# Patient Record
Sex: FEMALE
[De-identification: names, ages, dates, MRNs, and addresses within clinical notes are randomized per-mention and may not be internally consistent; named-entity substitution may affect disease eponyms.]

---

## 2018-07-10 ENCOUNTER — HOSPITAL ENCOUNTER (OUTPATIENT)
Dept: HOSPITAL 92 - BICMAMMO | Age: 48
Discharge: HOME | End: 2018-07-10
Attending: FAMILY MEDICINE
Payer: COMMERCIAL

## 2018-07-10 DIAGNOSIS — Z12.31: Primary | ICD-10-CM

## 2018-07-10 PROCEDURE — 77063 BREAST TOMOSYNTHESIS BI: CPT

## 2018-07-10 PROCEDURE — 77067 SCR MAMMO BI INCL CAD: CPT

## 2018-08-15 ENCOUNTER — HOSPITAL ENCOUNTER (OUTPATIENT)
Dept: HOSPITAL 92 - BICMAMMO | Age: 48
Discharge: HOME | End: 2018-08-15
Attending: FAMILY MEDICINE
Payer: COMMERCIAL

## 2018-08-15 DIAGNOSIS — R92.1: Primary | ICD-10-CM

## 2018-08-15 PROCEDURE — G0279 TOMOSYNTHESIS, MAMMO: HCPCS

## 2018-08-31 ENCOUNTER — HOSPITAL ENCOUNTER (OUTPATIENT)
Dept: HOSPITAL 92 - MAMMO | Age: 48
End: 2018-08-31
Attending: FAMILY MEDICINE
Payer: COMMERCIAL

## 2018-08-31 DIAGNOSIS — N60.92: Primary | ICD-10-CM

## 2018-08-31 PROCEDURE — 0HBT3ZX EXCISION OF RIGHT BREAST, PERCUTANEOUS APPROACH, DIAGNOSTIC: ICD-10-PCS

## 2018-08-31 PROCEDURE — 88305 TISSUE EXAM BY PATHOLOGIST: CPT

## 2018-08-31 PROCEDURE — 19081 BX BREAST 1ST LESION STRTCTC: CPT

## 2018-08-31 PROCEDURE — 76098 X-RAY EXAM SURGICAL SPECIMEN: CPT

## 2018-08-31 NOTE — MMO
MAMMO SURGICAL SPECIMEN:

 

Date:  08/31/18 

 

HISTORY:  

Stereotactic biopsy. 

 

COMPARISON:  

Stereotactic biopsy same date. 

 

FINDINGS:

There is satisfactory specimen with calcifications within a majority of the samples. Majority of the 
calcifications have been removed. 

 

IMPRESSION: 

Multiple calcifications within the sample. Adequate sample. 

 

 

POS: TRICE

## 2018-08-31 NOTE — MMO
STEREOTACTIC BREAST BIOPSY:

 

HISTORY: 

Left breast calcifications.

 

COMPARISON: 

Outside facility mammograms.

 

TECHNIQUE/FINDINGS: 

The patient was brought to the stereotactic suite.  All questions were answered.

 

The patient's left breast was prepped and draped in normal sterile fashion.  The calcifications are v
isualized using mammography. 

 

Two mL of buffered Lidocaine was instilled into the superficial and deep soft tissues.  A small derma
totomy was made after adequate anesthesia.

 

A total of six 10-gauge cores were obtained.  The calcifications were present, with near-complete rem
oval of all calcifications.

 

The patient tolerated the procedure well without complication.

 

IMPRESSION: 

Technically successful stereotactic biopsy.

 

POS: TRICE

## 2018-08-31 NOTE — MMO
MAMMOGRAM POST CLIP PLACEMENT:

 

Date:  08/31/18 

 

COMPARISON:  

Outside facility mammograms. 

 

FINDINGS:

There is satisfactory clip placement. 

 

IMPRESSION: 

Satisfactory position of the clip. 

 

 

POS: TRICE